# Patient Record
Sex: MALE | Race: ASIAN | URBAN - METROPOLITAN AREA
[De-identification: names, ages, dates, MRNs, and addresses within clinical notes are randomized per-mention and may not be internally consistent; named-entity substitution may affect disease eponyms.]

---

## 2024-01-12 ENCOUNTER — HOSPITAL ENCOUNTER (OUTPATIENT)
Age: 64
Setting detail: SPECIMEN
Discharge: HOME | End: 2024-01-12

## 2024-01-12 ENCOUNTER — OFFICE VISIT (OUTPATIENT)
Dept: PRIMARY CARE CLINIC | Age: 64
End: 2024-01-12
Payer: COMMERCIAL

## 2024-01-12 VITALS
DIASTOLIC BLOOD PRESSURE: 68 MMHG | WEIGHT: 150 LBS | HEART RATE: 77 BPM | SYSTOLIC BLOOD PRESSURE: 94 MMHG | HEIGHT: 63 IN | BODY MASS INDEX: 26.58 KG/M2 | OXYGEN SATURATION: 97 %

## 2024-01-12 DIAGNOSIS — K76.0 NONALCOHOLIC FATTY LIVER: ICD-10-CM

## 2024-01-12 DIAGNOSIS — R73.01 IMPAIRED FASTING GLUCOSE: Primary | ICD-10-CM

## 2024-01-12 DIAGNOSIS — R31.9 HEMATURIA, UNSPECIFIED TYPE: ICD-10-CM

## 2024-01-12 DIAGNOSIS — Z13.220 ENCOUNTER FOR SCREENING FOR LIPID DISORDER: ICD-10-CM

## 2024-01-12 DIAGNOSIS — E04.1 THYROID NODULE: ICD-10-CM

## 2024-01-12 DIAGNOSIS — Z11.59 ENCOUNTER FOR HEPATITIS C SCREENING TEST FOR LOW RISK PATIENT: ICD-10-CM

## 2024-01-12 DIAGNOSIS — N20.0 NEPHROLITHIASIS: ICD-10-CM

## 2024-01-12 DIAGNOSIS — I65.23 BILATERAL CAROTID ARTERY STENOSIS: ICD-10-CM

## 2024-01-12 DIAGNOSIS — Z12.5 ENCOUNTER FOR SCREENING FOR MALIGNANT NEOPLASM OF PROSTATE: ICD-10-CM

## 2024-01-12 DIAGNOSIS — R73.01 IMPAIRED FASTING GLUCOSE: ICD-10-CM

## 2024-01-12 LAB
ALBUMIN SERPL-MCNC: 4.4 G/DL (ref 3.5–5.2)
ALBUMIN/GLOB SERPL: 1.4 {RATIO} (ref 1–2.5)
ALP SERPL-CCNC: 73 U/L (ref 40–129)
ALT SERPL-CCNC: 28 U/L (ref 5–41)
ANION GAP SERPL CALCULATED.3IONS-SCNC: 10 MMOL/L (ref 9–17)
AST SERPL-CCNC: 23 U/L
BACTERIA URNS QL MICRO: NORMAL
BASOPHILS # BLD: 0.07 K/UL (ref 0–0.2)
BASOPHILS NFR BLD: 1 % (ref 0–2)
BILIRUB SERPL-MCNC: 0.6 MG/DL (ref 0.3–1.2)
BILIRUB UR QL STRIP: NEGATIVE
BUN SERPL-MCNC: 13 MG/DL (ref 8–23)
CALCIUM SERPL-MCNC: 9.4 MG/DL (ref 8.6–10.4)
CASTS #/AREA URNS LPF: NORMAL /LPF (ref 0–8)
CHLORIDE SERPL-SCNC: 102 MMOL/L (ref 98–107)
CHOLEST SERPL-MCNC: 196 MG/DL
CHOLESTEROL/HDL RATIO: 4.4
CLARITY UR: CLEAR
CO2 SERPL-SCNC: 27 MMOL/L (ref 20–31)
COLOR UR: YELLOW
CREAT SERPL-MCNC: 0.8 MG/DL (ref 0.7–1.2)
EOSINOPHIL # BLD: 0.18 K/UL (ref 0–0.44)
EOSINOPHILS RELATIVE PERCENT: 3 % (ref 1–4)
EPI CELLS #/AREA URNS HPF: NORMAL /HPF (ref 0–5)
ERYTHROCYTE [DISTWIDTH] IN BLOOD BY AUTOMATED COUNT: 12.9 % (ref 11.8–14.4)
GFR SERPL CREATININE-BSD FRML MDRD: >60 ML/MIN/1.73M2
GLUCOSE SERPL-MCNC: 107 MG/DL (ref 70–99)
GLUCOSE UR STRIP-MCNC: NEGATIVE MG/DL
HCT VFR BLD AUTO: 45.5 % (ref 40.7–50.3)
HCV AB SERPL QL IA: NONREACTIVE
HDLC SERPL-MCNC: 45 MG/DL
HGB BLD-MCNC: 15.1 G/DL (ref 13–17)
HGB UR QL STRIP.AUTO: NEGATIVE
IMM GRANULOCYTES # BLD AUTO: <0.03 K/UL (ref 0–0.3)
IMM GRANULOCYTES NFR BLD: 0 %
KETONES UR STRIP-MCNC: NEGATIVE MG/DL
LDLC SERPL CALC-MCNC: 133 MG/DL (ref 0–130)
LEUKOCYTE ESTERASE UR QL STRIP: NEGATIVE
LYMPHOCYTES NFR BLD: 1.63 K/UL (ref 1.1–3.7)
LYMPHOCYTES RELATIVE PERCENT: 23 % (ref 24–43)
MCH RBC QN AUTO: 31.4 PG (ref 25.2–33.5)
MCHC RBC AUTO-ENTMCNC: 33.2 G/DL (ref 28.4–34.8)
MCV RBC AUTO: 94.6 FL (ref 82.6–102.9)
MONOCYTES NFR BLD: 0.43 K/UL (ref 0.1–1.2)
MONOCYTES NFR BLD: 6 % (ref 3–12)
NEUTROPHILS NFR BLD: 67 % (ref 36–65)
NEUTS SEG NFR BLD: 4.66 K/UL (ref 1.5–8.1)
NITRITE UR QL STRIP: NEGATIVE
NRBC BLD-RTO: 0 PER 100 WBC
PH UR STRIP: 5.5 [PH] (ref 5–8)
PLATELET # BLD AUTO: 277 K/UL (ref 138–453)
PMV BLD AUTO: 9.3 FL (ref 8.1–13.5)
POTASSIUM SERPL-SCNC: 5.2 MMOL/L (ref 3.7–5.3)
PROT SERPL-MCNC: 7.6 G/DL (ref 6.4–8.3)
PROT UR STRIP-MCNC: NEGATIVE MG/DL
PSA SERPL-MCNC: 1.7 NG/ML
RBC # BLD AUTO: 4.81 M/UL (ref 4.21–5.77)
RBC #/AREA URNS HPF: NORMAL /HPF (ref 0–4)
SODIUM SERPL-SCNC: 139 MMOL/L (ref 135–144)
SP GR UR STRIP: 1.01 (ref 1–1.03)
TRIGL SERPL-MCNC: 92 MG/DL
TSH SERPL DL<=0.05 MIU/L-ACNC: 1.23 UIU/ML (ref 0.3–5)
UROBILINOGEN UR STRIP-ACNC: NORMAL EU/DL (ref 0–1)
WBC #/AREA URNS HPF: NORMAL /HPF (ref 0–5)
WBC OTHER # BLD: 7 K/UL (ref 3.5–11.3)

## 2024-01-12 PROCEDURE — 99204 OFFICE O/P NEW MOD 45 MIN: CPT | Performed by: STUDENT IN AN ORGANIZED HEALTH CARE EDUCATION/TRAINING PROGRAM

## 2024-01-12 SDOH — ECONOMIC STABILITY: INCOME INSECURITY: HOW HARD IS IT FOR YOU TO PAY FOR THE VERY BASICS LIKE FOOD, HOUSING, MEDICAL CARE, AND HEATING?: NOT HARD AT ALL

## 2024-01-12 SDOH — ECONOMIC STABILITY: HOUSING INSECURITY
IN THE LAST 12 MONTHS, WAS THERE A TIME WHEN YOU DID NOT HAVE A STEADY PLACE TO SLEEP OR SLEPT IN A SHELTER (INCLUDING NOW)?: NO

## 2024-01-12 SDOH — ECONOMIC STABILITY: FOOD INSECURITY: WITHIN THE PAST 12 MONTHS, YOU WORRIED THAT YOUR FOOD WOULD RUN OUT BEFORE YOU GOT MONEY TO BUY MORE.: NEVER TRUE

## 2024-01-12 SDOH — ECONOMIC STABILITY: FOOD INSECURITY: WITHIN THE PAST 12 MONTHS, THE FOOD YOU BOUGHT JUST DIDN'T LAST AND YOU DIDN'T HAVE MONEY TO GET MORE.: NEVER TRUE

## 2024-01-12 ASSESSMENT — PATIENT HEALTH QUESTIONNAIRE - PHQ9
1. LITTLE INTEREST OR PLEASURE IN DOING THINGS: 0
SUM OF ALL RESPONSES TO PHQ QUESTIONS 1-9: 0
SUM OF ALL RESPONSES TO PHQ9 QUESTIONS 1 & 2: 0
SUM OF ALL RESPONSES TO PHQ QUESTIONS 1-9: 0
2. FEELING DOWN, DEPRESSED OR HOPELESS: 0

## 2024-01-12 NOTE — PROGRESS NOTES
[facility]      Date of Visit:  2024  Patient Name: Jeffery Quan   Patient :  1960     CHIEF COMPLAINT:     Jeffery Quan is a 63 y.o. male who presents today to be evaluated for the following condition(s):  Chief Complaint   Patient presents with    Other     Pt checks his glucose at home due to family hx of diabetes - his numbers are normally around 105-125 in the mornings.     New Patient     Est care with provider - pt will need recent labs, last labs were completed approx 2+ years ago.     Pt had a screening colonoscopy 10+ years ago - removed polyps.          HISTORY OF PRESENT ILLNESS:      HPI   Patient is a new patient to our office who is presenting today to establish care.    Patient is a new patient to our office who is presenting today to establish care.     Patient is accompanied by her  and her son, Sharan, who serves as ; a  was offered but patient preferred to have son translate.       Patient recently relocated to the area from [place]; he has not been following with a primary care physician since [place] to the [place]    Patient has history of prediabetes; monitoring his fasting BG regularly; avg. 110-130.  Also has a h/o NAFLD.  No recent labs.  No N/V, abdominal pain.  Patient has a history of bilateral carotid artery plaque; no recent imaging.  He denies any lightheadedness, dizziness, presyncope or syncope.    Patient has a h/o multiple thyroid nodules; Right thyroid nodule; TI-RADS 2, Left thyroid nodule; TI RADS 3 but unclear of exact sizes.  No issues with anterior neck pain, swelling, dysphagia, odynophagia.  No change in voice or voice hoarseness.    Patient has a h/o nephrolithiasis; no recent imaging.  He has experience gross hematuria in past but not recently.  No dysuria, urgency, frequency.  He has had a pyelogram but no cystoscopy.  Evidently, there was discussion of ESWL but local hospital lacked the capability.    Patient with screening

## 2024-01-13 LAB
EST. AVERAGE GLUCOSE BLD GHB EST-MCNC: 134 MG/DL
HBA1C MFR BLD: 6.3 % (ref 4–6)

## 2024-01-24 ENCOUNTER — TELEPHONE (OUTPATIENT)
Dept: PRIMARY CARE CLINIC | Age: 64
End: 2024-01-24

## 2024-01-24 NOTE — TELEPHONE ENCOUNTER
Pt's son (), called stating has still not heard back from office on results.    He would like a call back please.    Thank you.

## 2024-01-25 DIAGNOSIS — R73.03 PREDIABETES: Primary | ICD-10-CM

## 2024-01-25 RX ORDER — LANCETS 30 GAUGE
1 EACH MISCELLANEOUS DAILY
Qty: 100 EACH | Refills: 4 | Status: SHIPPED | OUTPATIENT
Start: 2024-01-25

## 2024-01-25 RX ORDER — BLOOD-GLUCOSE METER
1 KIT MISCELLANEOUS DAILY
Qty: 1 KIT | Refills: 0 | Status: SHIPPED | OUTPATIENT
Start: 2024-01-25

## 2024-01-25 RX ORDER — PEN NEEDLE, DIABETIC 31 GX5/16"
1 NEEDLE, DISPOSABLE MISCELLANEOUS DAILY
Qty: 100 EACH | Refills: 4 | Status: SHIPPED | OUTPATIENT
Start: 2024-01-25

## 2024-01-25 RX ORDER — GLUCOSAMINE HCL/CHONDROITIN SU 500-400 MG
CAPSULE ORAL
Qty: 100 STRIP | Refills: 4 | Status: SHIPPED | OUTPATIENT
Start: 2024-01-25

## 2024-01-27 PROBLEM — I65.23 BILATERAL CAROTID ARTERY STENOSIS: Status: ACTIVE | Noted: 2024-01-27

## 2024-01-27 PROBLEM — R31.9 HEMATURIA: Status: ACTIVE | Noted: 2024-01-27

## 2024-01-27 PROBLEM — N20.0 NEPHROLITHIASIS: Status: ACTIVE | Noted: 2024-01-27

## 2024-01-27 PROBLEM — R73.01 IMPAIRED FASTING GLUCOSE: Status: ACTIVE | Noted: 2024-01-27

## 2024-01-27 ASSESSMENT — ENCOUNTER SYMPTOMS
DIARRHEA: 0
CONSTIPATION: 0
WHEEZING: 0
SHORTNESS OF BREATH: 0
ABDOMINAL PAIN: 0
VOMITING: 0
COUGH: 0
BLOOD IN STOOL: 0

## 2024-02-05 ENCOUNTER — PATIENT MESSAGE (OUTPATIENT)
Dept: PRIMARY CARE CLINIC | Age: 64
End: 2024-02-05

## 2024-02-05 ENCOUNTER — HOSPITAL ENCOUNTER (OUTPATIENT)
Dept: VASCULAR LAB | Age: 64
Discharge: HOME | End: 2024-02-07
Attending: STUDENT IN AN ORGANIZED HEALTH CARE EDUCATION/TRAINING PROGRAM
Payer: COMMERCIAL

## 2024-02-05 DIAGNOSIS — E78.00 PURE HYPERCHOLESTEROLEMIA: Primary | ICD-10-CM

## 2024-02-05 DIAGNOSIS — I65.23 BILATERAL CAROTID ARTERY STENOSIS: ICD-10-CM

## 2024-02-05 LAB
VAS LEFT BULB EDV: 28.3 CM/S
VAS LEFT BULB PSV: 87.6 CM/S
VAS LEFT CCA DIST EDV: 28.3 CM/S
VAS LEFT CCA DIST PSV: 98.6 CM/S
VAS LEFT CCA PROX EDV: 34.9 CM/S
VAS LEFT CCA PROX PSV: 116.1 CM/S
VAS LEFT ECA EDV: 23.9 CM/S
VAS LEFT ECA PSV: 91.9 CM/S
VAS LEFT ICA DIST EDV: 41.4 CM/S
VAS LEFT ICA DIST PSV: 80.9 CM/S
VAS LEFT ICA PROX EDV: 32.7 CM/S
VAS LEFT ICA PROX PSV: 74.3 CM/S
VAS LEFT ICA/CCA PSV: 0.9 NO UNITS
VAS LEFT VERTEBRAL EDV: 23.6 CM/S
VAS LEFT VERTEBRAL PSV: 65.6 CM/S
VAS RIGHT BULB EDV: 29 CM/S
VAS RIGHT BULB PSV: 94.7 CM/S
VAS RIGHT CCA DIST EDV: 34.5 CM/S
VAS RIGHT CCA DIST PSV: 105.7 CM/S
VAS RIGHT CCA PROX EDV: 30.9 CM/S
VAS RIGHT CCA PROX PSV: 102.1 CM/S
VAS RIGHT ECA EDV: 16.2 CM/S
VAS RIGHT ECA PSV: 89.2 CM/S
VAS RIGHT ICA DIST EDV: 39.9 CM/S
VAS RIGHT ICA DIST PSV: 83.7 CM/S
VAS RIGHT ICA PROX EDV: 29 CM/S
VAS RIGHT ICA PROX PSV: 72.8 CM/S
VAS RIGHT ICA/CCA PSV: 0.9 NO UNITS
VAS RIGHT VERTEBRAL EDV: 18.6 CM/S
VAS RIGHT VERTEBRAL PSV: 53.7 CM/S

## 2024-02-05 PROCEDURE — 93880 EXTRACRANIAL BILAT STUDY: CPT | Performed by: SURGERY

## 2024-02-05 PROCEDURE — 93880 EXTRACRANIAL BILAT STUDY: CPT

## 2024-02-06 NOTE — TELEPHONE ENCOUNTER
From: Jeffery Quan  To: Dr. Rosendo Bobby  Sent: 2/5/2024 11:46 PM EST  Subject: atorvastatin 40 mg    Dr. Bobby, saw your comment on the Carotid test, talked to my father about the statin, he is ready to start it if you are able to send in the Rx. Also would you consider write him some baseline lab orders for LFTS, CK, and maybe a CRP? Thank you for your time!

## 2024-02-07 RX ORDER — ATORVASTATIN CALCIUM 40 MG/1
40 TABLET, FILM COATED ORAL DAILY
Qty: 90 TABLET | Refills: 1 | Status: SHIPPED | OUTPATIENT
Start: 2024-02-07

## 2024-02-12 ENCOUNTER — OFFICE VISIT (OUTPATIENT)
Dept: PRIMARY CARE CLINIC | Age: 64
End: 2024-02-12
Payer: COMMERCIAL

## 2024-02-12 VITALS
BODY MASS INDEX: 27.11 KG/M2 | SYSTOLIC BLOOD PRESSURE: 102 MMHG | WEIGHT: 153 LBS | DIASTOLIC BLOOD PRESSURE: 60 MMHG | OXYGEN SATURATION: 95 % | HEART RATE: 79 BPM | HEIGHT: 63 IN

## 2024-02-12 DIAGNOSIS — E78.00 PURE HYPERCHOLESTEROLEMIA: Primary | ICD-10-CM

## 2024-02-12 DIAGNOSIS — K21.9 GASTROESOPHAGEAL REFLUX DISEASE WITHOUT ESOPHAGITIS: ICD-10-CM

## 2024-02-12 DIAGNOSIS — G47.33 OBSTRUCTIVE SLEEP APNEA: ICD-10-CM

## 2024-02-12 DIAGNOSIS — J30.9 ALLERGIC RHINITIS, UNSPECIFIED SEASONALITY, UNSPECIFIED TRIGGER: ICD-10-CM

## 2024-02-12 DIAGNOSIS — H04.129 DRY EYE: ICD-10-CM

## 2024-02-12 DIAGNOSIS — R73.03 PREDIABETES: ICD-10-CM

## 2024-02-12 PROCEDURE — 99417 PROLNG OP E/M EACH 15 MIN: CPT | Performed by: STUDENT IN AN ORGANIZED HEALTH CARE EDUCATION/TRAINING PROGRAM

## 2024-02-12 PROCEDURE — 99215 OFFICE O/P EST HI 40 MIN: CPT | Performed by: STUDENT IN AN ORGANIZED HEALTH CARE EDUCATION/TRAINING PROGRAM

## 2024-02-12 RX ORDER — OMEPRAZOLE 20 MG/1
20 CAPSULE, DELAYED RELEASE ORAL
Qty: 30 CAPSULE | Refills: 2 | Status: SHIPPED | OUTPATIENT
Start: 2024-02-12

## 2024-02-12 RX ORDER — FLUTICASONE PROPIONATE 50 MCG
1 SPRAY, SUSPENSION (ML) NASAL DAILY
Qty: 32 G | Refills: 1 | Status: SHIPPED | OUTPATIENT
Start: 2024-02-12

## 2024-02-12 NOTE — PROGRESS NOTES
[facility]      Date of Visit:  2024  Patient Name: Jeffery Quan   Patient :  1960     CHIEF COMPLAINT:     Jeffery Quan is a 63 y.o. male who presents today to be evaluated for the following condition(s):  Chief Complaint   Patient presents with    Follow-up     Pt would like to review labs from [facility]. There was elevated glucose that pt's son has questions about. Pt would like to know if he needs medications at this time.     Sleep Apnea     Pt is having trouble sleeping even while using his CPAP. Pt states that he feels like there is inflammation in his nostrils after using the machine.        HISTORY OF PRESENT ILLNESS:      HPI     REVIEW OF SYSTEMS:     Review of Systems     REVIEWED INFORMATION:      Current Outpatient Medications on File Prior to Visit   Medication Sig Dispense Refill    atorvastatin (LIPITOR) 40 MG tablet Take 1 tablet by mouth daily 90 tablet 1    glucose monitoring kit 1 kit by Does not apply route daily 1 kit 0    blood glucose monitor strips Test 1 time daily & as needed for symptoms of irregular blood glucose. Dispense sufficient amount for indicated testing frequency plus additional to accommodate PRN testing needs. 100 strip 4    Lancets MISC 1 each by Does not apply route daily 100 each 4    Alcohol Swabs (ALCOHOL PREP) PADS 1 each by Does not apply route daily 100 each 4     No current facility-administered medications on file prior to visit.       No Known Allergies    Patient Active Problem List   Diagnosis    Nonalcoholic fatty liver    Thyroid nodule    Impaired fasting glucose    Nephrolithiasis    Hematuria    Bilateral carotid artery stenosis    Pure hypercholesterolemia    Obstructive sleep apnea    Dry eye    Allergic rhinitis    Gastroesophageal reflux disease without esophagitis       Past Medical History:   Diagnosis Date    Carotid atherosclerosis     Cataracts, bilateral     blurry vision    Hydronephrosis     R side    Kidney stone     R side

## 2024-02-26 ENCOUNTER — HOSPITAL ENCOUNTER (OUTPATIENT)
Dept: ULTRASOUND IMAGING | Age: 64
Discharge: HOME | End: 2024-02-28
Attending: STUDENT IN AN ORGANIZED HEALTH CARE EDUCATION/TRAINING PROGRAM
Payer: COMMERCIAL

## 2024-02-26 DIAGNOSIS — E04.1 THYROID NODULE: ICD-10-CM

## 2024-02-26 PROCEDURE — 76536 US EXAM OF HEAD AND NECK: CPT

## 2024-03-05 ENCOUNTER — HOSPITAL ENCOUNTER (OUTPATIENT)
Age: 64
Discharge: HOME | End: 2024-03-05
Payer: COMMERCIAL

## 2024-03-05 ENCOUNTER — HOSPITAL ENCOUNTER (OUTPATIENT)
Dept: CT IMAGING | Age: 64
Discharge: HOME | End: 2024-03-07
Attending: STUDENT IN AN ORGANIZED HEALTH CARE EDUCATION/TRAINING PROGRAM
Payer: COMMERCIAL

## 2024-03-05 DIAGNOSIS — R31.9 HEMATURIA, UNSPECIFIED TYPE: ICD-10-CM

## 2024-03-05 DIAGNOSIS — N20.0 NEPHROLITHIASIS: ICD-10-CM

## 2024-03-05 DIAGNOSIS — E78.00 PURE HYPERCHOLESTEROLEMIA: ICD-10-CM

## 2024-03-05 LAB
CREAT SERPL-MCNC: 0.9 MG/DL (ref 0.7–1.2)
GFR SERPL CREATININE-BSD FRML MDRD: >60 ML/MIN/1.73M2

## 2024-03-05 PROCEDURE — 2580000003 HC RX 258: Performed by: STUDENT IN AN ORGANIZED HEALTH CARE EDUCATION/TRAINING PROGRAM

## 2024-03-05 PROCEDURE — 74178 CT ABD&PLV WO CNTR FLWD CNTR: CPT | Performed by: STUDENT IN AN ORGANIZED HEALTH CARE EDUCATION/TRAINING PROGRAM

## 2024-03-05 PROCEDURE — 82565 ASSAY OF CREATININE: CPT

## 2024-03-05 PROCEDURE — 6360000004 HC RX CONTRAST MEDICATION: Performed by: STUDENT IN AN ORGANIZED HEALTH CARE EDUCATION/TRAINING PROGRAM

## 2024-03-05 PROCEDURE — 80053 COMPREHEN METABOLIC PANEL: CPT

## 2024-03-05 RX ORDER — SODIUM CHLORIDE 0.9 % (FLUSH) 0.9 %
10 SYRINGE (ML) INJECTION PRN
Status: DISCONTINUED | OUTPATIENT
Start: 2024-03-05 | End: 2024-03-08 | Stop reason: HOSPADM

## 2024-03-05 RX ORDER — 0.9 % SODIUM CHLORIDE 0.9 %
80 INTRAVENOUS SOLUTION INTRAVENOUS ONCE
Status: COMPLETED | OUTPATIENT
Start: 2024-03-05 | End: 2024-03-05

## 2024-03-05 RX ADMIN — IOPAMIDOL 120 ML: 755 INJECTION, SOLUTION INTRAVENOUS at 14:43

## 2024-03-05 RX ADMIN — SODIUM CHLORIDE 80 ML: 9 INJECTION, SOLUTION INTRAVENOUS at 14:44

## 2024-03-05 RX ADMIN — SODIUM CHLORIDE, PRESERVATIVE FREE 10 ML: 5 INJECTION INTRAVENOUS at 14:43

## 2024-03-06 LAB
ALBUMIN SERPL-MCNC: 4.4 G/DL (ref 3.5–5.2)
ALBUMIN/GLOB SERPL: 1 {RATIO} (ref 1–2.5)
ALP SERPL-CCNC: 74 U/L (ref 40–129)
ALT SERPL-CCNC: 26 U/L (ref 10–50)
ANION GAP SERPL CALCULATED.3IONS-SCNC: 14 MMOL/L (ref 9–16)
AST SERPL-CCNC: 32 U/L (ref 10–50)
BILIRUB SERPL-MCNC: 0.4 MG/DL (ref 0–1.2)
BUN SERPL-MCNC: 14 MG/DL (ref 8–23)
CALCIUM SERPL-MCNC: 9.4 MG/DL (ref 8.6–10.4)
CHLORIDE SERPL-SCNC: 106 MMOL/L (ref 98–107)
CO2 SERPL-SCNC: 24 MMOL/L (ref 20–31)
CREAT SERPL-MCNC: 0.9 MG/DL (ref 0.7–1.2)
GFR SERPL CREATININE-BSD FRML MDRD: >60 ML/MIN/1.73M2
GLUCOSE SERPL-MCNC: 86 MG/DL (ref 74–99)
POTASSIUM SERPL-SCNC: 4.9 MMOL/L (ref 3.7–5.3)
PROT SERPL-MCNC: 7.6 G/DL (ref 6.6–8.7)
SODIUM SERPL-SCNC: 144 MMOL/L (ref 136–145)

## 2024-03-11 DIAGNOSIS — E78.00 PURE HYPERCHOLESTEROLEMIA: ICD-10-CM

## 2024-03-11 DIAGNOSIS — I65.23 BILATERAL CAROTID ARTERY STENOSIS: ICD-10-CM

## 2024-03-11 DIAGNOSIS — R73.03 PREDIABETES: ICD-10-CM

## 2024-03-11 RX ORDER — ATORVASTATIN CALCIUM 40 MG/1
40 TABLET, FILM COATED ORAL DAILY
Qty: 90 TABLET | Refills: 1 | Status: SHIPPED | OUTPATIENT
Start: 2024-03-11

## 2024-03-11 RX ORDER — GLUCOSAMINE HCL/CHONDROITIN SU 500-400 MG
CAPSULE ORAL
Qty: 100 STRIP | Refills: 4 | Status: CANCELLED | OUTPATIENT
Start: 2024-03-11

## 2024-03-11 RX ORDER — ATORVASTATIN CALCIUM 40 MG/1
40 TABLET, FILM COATED ORAL DAILY
Qty: 90 TABLET | Refills: 1 | OUTPATIENT
Start: 2024-03-11

## 2024-03-11 NOTE — TELEPHONE ENCOUNTER
Patient Comment: Can you send this to [facility] mail order pharmacy instead of please? Thank you       Jeffery Quan is requesting a refill on the following medication(s):  Requested Prescriptions     Pending Prescriptions Disp Refills    atorvastatin (LIPITOR) 40 MG tablet 90 tablet 1     Sig: Take 1 tablet by mouth daily     Refused Prescriptions Disp Refills    atorvastatin (LIPITOR) 40 MG tablet 90 tablet 1     Sig: Take 1 tablet by mouth daily     Refused By: KONG DOUGLASS     Reason for Refusal: Request already responded to by other means (e.g. phone or fax)       Last Visit Date (If Applicable):  2/12/2024    Next Visit Date:    3/11/2024

## 2024-03-11 NOTE — TELEPHONE ENCOUNTER
Patient Comment: Can you send the test strip brand “one touch ultra test strip” send to [facility] mail pharmacy instead of [facility] please? Thank you       Caryjavier Quan is requesting a refill on the following medication(s):  Requested Prescriptions     Pending Prescriptions Disp Refills    blood glucose monitor strips 100 strip 4     Sig: Test 1 time daily & as needed for symptoms of irregular blood glucose. Dispense sufficient amount for indicated testing frequency plus additional to accommodate PRN testing needs.       Last Visit Date (If Applicable):  2/12/2024    Next Visit Date:    3/25/2024

## 2024-03-25 ENCOUNTER — HOSPITAL ENCOUNTER (OUTPATIENT)
Age: 64
Setting detail: SPECIMEN
Discharge: HOME | End: 2024-03-25

## 2024-03-25 DIAGNOSIS — E78.00 PURE HYPERCHOLESTEROLEMIA: ICD-10-CM

## 2024-03-25 LAB
CHOLEST SERPL-MCNC: 104 MG/DL (ref 0–199)
CHOLESTEROL/HDL RATIO: 2
HDLC SERPL-MCNC: 47 MG/DL
LDLC SERPL CALC-MCNC: 45 MG/DL (ref 0–100)
TRIGL SERPL-MCNC: 63 MG/DL
VLDLC SERPL CALC-MCNC: 13 MG/DL

## 2024-04-03 DIAGNOSIS — R73.01 IMPAIRED FASTING GLUCOSE: Primary | ICD-10-CM

## 2024-04-03 DIAGNOSIS — E78.00 PURE HYPERCHOLESTEROLEMIA: ICD-10-CM

## 2024-04-03 RX ORDER — ATORVASTATIN CALCIUM 20 MG/1
20 TABLET, FILM COATED ORAL DAILY
Qty: 90 TABLET | Refills: 3 | Status: SHIPPED | OUTPATIENT
Start: 2024-04-03 | End: 2024-04-03

## 2024-04-03 RX ORDER — GLUCOSAMINE HCL/CHONDROITIN SU 500-400 MG
CAPSULE ORAL
Qty: 90 STRIP | Refills: 3 | Status: SHIPPED | OUTPATIENT
Start: 2024-04-03

## 2024-04-03 RX ORDER — GLUCOSAMINE HCL/CHONDROITIN SU 500-400 MG
CAPSULE ORAL
Qty: 90 STRIP | Refills: 3 | Status: SHIPPED | OUTPATIENT
Start: 2024-04-03 | End: 2024-04-03 | Stop reason: CLARIF

## 2024-04-03 RX ORDER — BLOOD-GLUCOSE METER
1 KIT MISCELLANEOUS DAILY
Qty: 1 KIT | Refills: 0 | Status: SHIPPED | OUTPATIENT
Start: 2024-04-03

## 2024-04-03 RX ORDER — ATORVASTATIN CALCIUM 20 MG/1
20 TABLET, FILM COATED ORAL DAILY
Qty: 90 TABLET | Refills: 3 | Status: SHIPPED | OUTPATIENT
Start: 2024-04-03

## 2024-04-16 ENCOUNTER — OFFICE VISIT (OUTPATIENT)
Dept: PRIMARY CARE CLINIC | Age: 64
End: 2024-04-16
Payer: COMMERCIAL

## 2024-04-16 VITALS
OXYGEN SATURATION: 95 % | HEIGHT: 63 IN | SYSTOLIC BLOOD PRESSURE: 98 MMHG | HEART RATE: 75 BPM | BODY MASS INDEX: 27.29 KG/M2 | DIASTOLIC BLOOD PRESSURE: 56 MMHG | WEIGHT: 154 LBS

## 2024-04-16 DIAGNOSIS — N20.0 NEPHROLITHIASIS: ICD-10-CM

## 2024-04-16 DIAGNOSIS — R73.03 PREDIABETES: Primary | ICD-10-CM

## 2024-04-16 DIAGNOSIS — E78.00 PURE HYPERCHOLESTEROLEMIA: ICD-10-CM

## 2024-04-16 PROCEDURE — 99214 OFFICE O/P EST MOD 30 MIN: CPT | Performed by: STUDENT IN AN ORGANIZED HEALTH CARE EDUCATION/TRAINING PROGRAM

## 2024-04-16 PROCEDURE — 83036 HEMOGLOBIN GLYCOSYLATED A1C: CPT | Performed by: STUDENT IN AN ORGANIZED HEALTH CARE EDUCATION/TRAINING PROGRAM

## 2024-04-16 RX ORDER — IBUPROFEN 800 MG/1
800 TABLET ORAL 3 TIMES DAILY PRN
Qty: 90 TABLET | Refills: 0 | Status: SHIPPED | OUTPATIENT
Start: 2024-04-16 | End: 2024-05-06 | Stop reason: SDUPTHER

## 2024-04-16 NOTE — PROGRESS NOTES
[facility]      Date of Visit:  2024  Patient Name: Jeffery Quan   Patient :  1960     CHIEF COMPLAINT:     Jeffery Quan is a 63 y.o. male who presents today to be evaluated for the following condition(s):  Chief Complaint   Patient presents with    Follow-up     Pt was unable to get the test strips from the pharmacy to check his BG. Pt denies fatigue, shaking, clamminess, floaters in his vision.        HISTORY OF PRESENT ILLNESS:      HPI     REVIEW OF SYSTEMS:     Review of Systems   Constitutional:  Negative for chills, fatigue and fever.   Respiratory:  Negative for cough, shortness of breath and wheezing.    Cardiovascular:  Negative for chest pain, palpitations and leg swelling.   Gastrointestinal:  Negative for abdominal pain, blood in stool, constipation, diarrhea, nausea and vomiting.   Neurological:  Negative for dizziness, weakness, light-headedness, numbness and headaches.        REVIEWED INFORMATION:      Current Outpatient Medications on File Prior to Visit   Medication Sig Dispense Refill    glucose monitoring kit 1 kit by Does not apply route daily 1 kit 0    atorvastatin (LIPITOR) 20 MG tablet Take 1 tablet by mouth daily 90 tablet 3    blood glucose monitor strips Test once a day & as needed for symptoms of irregular blood glucose. Dispense sufficient amount for indicated testing frequency plus additional to accommodate PRN testing needs. 90 strip 3    fluticasone (FLONASE) 50 MCG/ACT nasal spray 1 spray by Each Nostril route daily 32 g 1    omeprazole (PRILOSEC) 20 MG delayed release capsule Take 1 capsule by mouth every morning (before breakfast) 30 capsule 2    Lancets MISC 1 each by Does not apply route daily 100 each 4    Alcohol Swabs (ALCOHOL PREP) PADS 1 each by Does not apply route daily 100 each 4     No current facility-administered medications on file prior to visit.       No Known Allergies    Patient Active Problem List   Diagnosis    Nonalcoholic fatty liver

## 2024-05-07 RX ORDER — IBUPROFEN 800 MG/1
800 TABLET ORAL 3 TIMES DAILY PRN
Qty: 90 TABLET | Refills: 0 | Status: SHIPPED | OUTPATIENT
Start: 2024-05-07

## 2024-05-15 ENCOUNTER — TELEPHONE (OUTPATIENT)
Dept: PRIMARY CARE CLINIC | Age: 64
End: 2024-05-15

## 2024-05-15 NOTE — TELEPHONE ENCOUNTER
Madiha from [facility] pharmacy benefits calling to inform that the script you wrote for test strips need to be changed, need to be changed to 300 strips for 90 day supply, stated the patient check his sugars after every meal, stated this will be covered with the insurance, this can be sent to the same [facility] pharmacy and the patient would like a call back once this is complete

## 2024-05-16 DIAGNOSIS — J30.9 ALLERGIC RHINITIS, UNSPECIFIED SEASONALITY, UNSPECIFIED TRIGGER: ICD-10-CM

## 2024-05-16 DIAGNOSIS — R73.01 IMPAIRED FASTING GLUCOSE: ICD-10-CM

## 2024-05-16 RX ORDER — GLUCOSAMINE HCL/CHONDROITIN SU 500-400 MG
CAPSULE ORAL
Qty: 90 STRIP | Refills: 3 | Status: CANCELLED | OUTPATIENT
Start: 2024-05-16

## 2024-05-17 DIAGNOSIS — R73.01 IMPAIRED FASTING GLUCOSE: Primary | ICD-10-CM

## 2024-05-17 RX ORDER — FLUTICASONE PROPIONATE 50 MCG
1 SPRAY, SUSPENSION (ML) NASAL DAILY
Qty: 32 G | Refills: 1 | Status: SHIPPED | OUTPATIENT
Start: 2024-05-17

## 2024-05-17 RX ORDER — GLUCOSAMINE HCL/CHONDROITIN SU 500-400 MG
CAPSULE ORAL
Qty: 100 STRIP | Refills: 3 | Status: SHIPPED | OUTPATIENT
Start: 2024-05-17

## 2024-05-17 NOTE — TELEPHONE ENCOUNTER
Last OV:  4/16/2024    Next OV: 5/16/2024    Pharmacy:     [facility] - [place] [address] - P [phone] - F [phone]  [address] [place] [address]  Phone: [phone] Fax: [phone]    Confirmed? Yes

## 2024-05-17 NOTE — TELEPHONE ENCOUNTER
Last OV:  4/16/2024    Next OV: Visit date not found    Pharmacy:   [id number] - [place], [place] - [address] - P [phone] - F [phone]  [id number] [id number]  Phone: [phone] Fax: [phone]        Confirmed? Yes

## 2024-05-17 NOTE — TELEPHONE ENCOUNTER
Last Visit Date: 4/16/2024   Next Visit Date: Visit date not found     Pharmacy is requesting a new Rx for pt's BG testing strips.

## 2024-06-24 ASSESSMENT — ENCOUNTER SYMPTOMS
COUGH: 0
WHEEZING: 0
NAUSEA: 0
SHORTNESS OF BREATH: 0
ABDOMINAL PAIN: 0
DIARRHEA: 0
VOMITING: 0
CONSTIPATION: 0
BLOOD IN STOOL: 0

## 2024-07-31 ENCOUNTER — HOSPITAL ENCOUNTER (OUTPATIENT)
Age: 64
Setting detail: SPECIMEN
Discharge: HOME | End: 2024-07-31

## 2024-07-31 DIAGNOSIS — E78.00 PURE HYPERCHOLESTEROLEMIA: ICD-10-CM

## 2024-07-31 LAB
ALBUMIN SERPL-MCNC: 4.4 G/DL (ref 3.5–5.2)
ALBUMIN/GLOB SERPL: 2 {RATIO} (ref 1–2.5)
ALP SERPL-CCNC: 78 U/L (ref 40–129)
ALT SERPL-CCNC: 31 U/L (ref 10–50)
ANION GAP SERPL CALCULATED.3IONS-SCNC: 10 MMOL/L (ref 9–16)
AST SERPL-CCNC: 26 U/L (ref 10–50)
BILIRUB SERPL-MCNC: 0.4 MG/DL (ref 0–1.2)
BUN SERPL-MCNC: 18 MG/DL (ref 8–23)
CALCIUM SERPL-MCNC: 9 MG/DL (ref 8.6–10.4)
CHLORIDE SERPL-SCNC: 107 MMOL/L (ref 98–107)
CHOLEST SERPL-MCNC: 122 MG/DL (ref 0–199)
CHOLESTEROL/HDL RATIO: 2
CO2 SERPL-SCNC: 25 MMOL/L (ref 20–31)
CREAT SERPL-MCNC: 0.9 MG/DL (ref 0.7–1.2)
GFR, ESTIMATED: >90 ML/MIN/1.73M2
GLUCOSE P FAST SERPL-MCNC: 107 MG/DL (ref 74–99)
HDLC SERPL-MCNC: 51 MG/DL
LDLC SERPL CALC-MCNC: 50 MG/DL (ref 0–100)
POTASSIUM SERPL-SCNC: 4.3 MMOL/L (ref 3.7–5.3)
PROT SERPL-MCNC: 7.3 G/DL (ref 6.6–8.7)
SODIUM SERPL-SCNC: 142 MMOL/L (ref 136–145)
TRIGL SERPL-MCNC: 103 MG/DL (ref 0–149)
VLDLC SERPL CALC-MCNC: 21 MG/DL

## 2024-11-03 DIAGNOSIS — J30.9 ALLERGIC RHINITIS, UNSPECIFIED SEASONALITY, UNSPECIFIED TRIGGER: ICD-10-CM

## 2024-11-05 NOTE — TELEPHONE ENCOUNTER
Jeffery Quan is calling to request a refill on the following medication(s):    Medication Request:  Requested Prescriptions     Pending Prescriptions Disp Refills    fluticasone (FLONASE) 50 MCG/ACT nasal spray [Pharmacy Med Name: FLUTICASONE  SPR 50MCG RX] 32 g 1     Sig: USE 1 SPRAY IN EACH NOSTRILDAILY     MG tablet [Pharmacy Med Name: IBU TAB 800MG] 90 tablet 0     Sig: TAKE 1 TABLET 3 TIMES A DAYAS NEEDED FOR PAIN       Last Visit Date (If Applicable):  4/16/2024    Next Visit Date:    Visit date not found

## 2024-11-06 RX ORDER — IBUPROFEN 800 MG/1
TABLET ORAL
Qty: 90 TABLET | Refills: 0 | Status: SHIPPED | OUTPATIENT
Start: 2024-11-06 | End: 2024-12-19 | Stop reason: SDUPTHER

## 2024-11-06 RX ORDER — FLUTICASONE PROPIONATE 50 MCG
SPRAY, SUSPENSION (ML) NASAL
Qty: 32 G | Refills: 1 | Status: SHIPPED | OUTPATIENT
Start: 2024-11-06 | End: 2024-12-19 | Stop reason: SDUPTHER

## 2024-11-13 DIAGNOSIS — R73.01 IMPAIRED FASTING GLUCOSE: ICD-10-CM

## 2024-11-15 RX ORDER — BLOOD-GLUCOSE METER
EACH MISCELLANEOUS
Qty: 1 KIT | Refills: 0 | Status: SHIPPED | OUTPATIENT
Start: 2024-11-15

## 2024-12-10 ENCOUNTER — PATIENT MESSAGE (OUTPATIENT)
Dept: PRIMARY CARE CLINIC | Age: 64
End: 2024-12-10

## 2024-12-10 DIAGNOSIS — R73.03 PREDIABETES: ICD-10-CM

## 2024-12-10 DIAGNOSIS — E78.00 PURE HYPERCHOLESTEROLEMIA: Primary | ICD-10-CM

## 2024-12-12 ENCOUNTER — HOSPITAL ENCOUNTER (OUTPATIENT)
Age: 64
Setting detail: SPECIMEN
Discharge: HOME | End: 2024-12-12

## 2024-12-12 DIAGNOSIS — E78.00 PURE HYPERCHOLESTEROLEMIA: ICD-10-CM

## 2024-12-12 DIAGNOSIS — R73.03 PREDIABETES: ICD-10-CM

## 2024-12-12 LAB
ALBUMIN SERPL-MCNC: 4.4 G/DL (ref 3.5–5.2)
ALBUMIN/GLOB SERPL: 1.4 {RATIO} (ref 1–2.5)
ALP SERPL-CCNC: 78 U/L (ref 40–129)
ALT SERPL-CCNC: 25 U/L (ref 10–50)
ANION GAP SERPL CALCULATED.3IONS-SCNC: 9 MMOL/L (ref 9–16)
AST SERPL-CCNC: 25 U/L (ref 10–50)
BILIRUB SERPL-MCNC: 0.5 MG/DL (ref 0–1.2)
BUN SERPL-MCNC: 16 MG/DL (ref 8–23)
CALCIUM SERPL-MCNC: 8.9 MG/DL (ref 8.6–10.4)
CHLORIDE SERPL-SCNC: 107 MMOL/L (ref 98–107)
CHOLEST SERPL-MCNC: 123 MG/DL (ref 0–199)
CHOLESTEROL/HDL RATIO: 2.7
CO2 SERPL-SCNC: 28 MMOL/L (ref 20–31)
CREAT SERPL-MCNC: 0.9 MG/DL (ref 0.7–1.2)
EST. AVERAGE GLUCOSE BLD GHB EST-MCNC: 154 MG/DL
GFR, ESTIMATED: >90 ML/MIN/1.73M2
GLUCOSE P FAST SERPL-MCNC: 118 MG/DL (ref 74–99)
HBA1C MFR BLD: 7 % (ref 4–6)
HDLC SERPL-MCNC: 46 MG/DL
LDLC SERPL CALC-MCNC: 65 MG/DL (ref 0–100)
POTASSIUM SERPL-SCNC: 4.3 MMOL/L (ref 3.7–5.3)
PROT SERPL-MCNC: 7.5 G/DL (ref 6.6–8.7)
SODIUM SERPL-SCNC: 144 MMOL/L (ref 136–145)
TRIGL SERPL-MCNC: 59 MG/DL (ref 0–149)
VLDLC SERPL CALC-MCNC: 12 MG/DL (ref 1–30)

## 2024-12-19 DIAGNOSIS — R73.01 IMPAIRED FASTING GLUCOSE: ICD-10-CM

## 2024-12-19 DIAGNOSIS — K21.9 GASTROESOPHAGEAL REFLUX DISEASE WITHOUT ESOPHAGITIS: ICD-10-CM

## 2024-12-19 DIAGNOSIS — J30.9 ALLERGIC RHINITIS, UNSPECIFIED SEASONALITY, UNSPECIFIED TRIGGER: ICD-10-CM

## 2024-12-19 NOTE — TELEPHONE ENCOUNTER
Jeffery Quan is requesting a refill on the following medication(s):  Requested Prescriptions     Pending Prescriptions Disp Refills    Blood Glucose Monitoring Suppl (ACCU-CHEK GUIDE ME) w/Device KIT 1 kit 0       Last Visit Date (If Applicable):  4/16/2024    Next Visit Date:    12/19/2024

## 2024-12-19 NOTE — TELEPHONE ENCOUNTER
Jeffery Quan is requesting a refill on the following medication(s):  Requested Prescriptions     Pending Prescriptions Disp Refills    blood glucose monitor strips 100 strip 3     Sig: Test once a day & as needed for symptoms of irregular blood glucose. Dispense sufficient amount for indicated testing frequency plus additional to accommodate PRN testing needs.Test once a day & as needed for symptoms of irregular blood glucose. Dispense sufficient amount for indicated testing frequency plus additional to accommodate PRN testing needs.       Last Visit Date (If Applicable):  4/16/2024    Next Visit Date:    12/19/2024

## 2024-12-19 NOTE — TELEPHONE ENCOUNTER
Jeffery Quan is requesting a refill on the following medication(s):  Requested Prescriptions     Pending Prescriptions Disp Refills    omeprazole (PRILOSEC) 20 MG delayed release capsule 30 capsule 2     Sig: Take 1 capsule by mouth every morning (before breakfast)       Last Visit Date (If Applicable):  4/16/2024    Next Visit Date:    12/26/2024

## 2024-12-19 NOTE — TELEPHONE ENCOUNTER
Jeffery Quan is requesting a refill on the following medication(s):  Requested Prescriptions     Pending Prescriptions Disp Refills    fluticasone (FLONASE) 50 MCG/ACT nasal spray 32 g 1       Last Visit Date (If Applicable):  4/16/2024    Next Visit Date:    12/19/2024

## 2024-12-19 NOTE — TELEPHONE ENCOUNTER
Jeffery Quan is requesting a refill on the following medication(s):  Requested Prescriptions     Pending Prescriptions Disp Refills    ibuprofen (IBU) 800 MG tablet 90 tablet 0       Last Visit Date (If Applicable):  4/16/2024    Next Visit Date:    12/26/2024

## 2024-12-20 DIAGNOSIS — K21.9 GASTROESOPHAGEAL REFLUX DISEASE WITHOUT ESOPHAGITIS: ICD-10-CM

## 2024-12-20 RX ORDER — BLOOD-GLUCOSE METER
EACH MISCELLANEOUS
Qty: 1 KIT | Refills: 0 | Status: SHIPPED | OUTPATIENT
Start: 2024-12-20

## 2024-12-20 RX ORDER — FLUTICASONE PROPIONATE 50 MCG
1 SPRAY, SUSPENSION (ML) NASAL DAILY
Qty: 32 G | Refills: 3 | Status: SHIPPED | OUTPATIENT
Start: 2024-12-20

## 2024-12-20 RX ORDER — IBUPROFEN 800 MG/1
800 TABLET, FILM COATED ORAL EVERY 8 HOURS PRN
Qty: 90 TABLET | Refills: 0 | Status: SHIPPED | OUTPATIENT
Start: 2024-12-20

## 2024-12-20 RX ORDER — GLUCOSAMINE HCL/CHONDROITIN SU 500-400 MG
CAPSULE ORAL
Qty: 100 STRIP | Refills: 3 | Status: SHIPPED | OUTPATIENT
Start: 2024-12-20

## 2024-12-23 NOTE — TELEPHONE ENCOUNTER
Patient comment: the previous refills were sent to [facility], can you send it to [facility] mail order pharmacy instead please?

## 2024-12-26 DIAGNOSIS — R73.03 PREDIABETES: Primary | ICD-10-CM

## 2024-12-26 RX ORDER — METFORMIN HYDROCHLORIDE 500 MG/1
500 TABLET, EXTENDED RELEASE ORAL
Qty: 90 TABLET | Refills: 1 | Status: SHIPPED | OUTPATIENT
Start: 2024-12-26

## 2025-04-25 DIAGNOSIS — E78.00 PURE HYPERCHOLESTEROLEMIA: ICD-10-CM

## 2025-04-28 RX ORDER — ATORVASTATIN CALCIUM 20 MG/1
20 TABLET, FILM COATED ORAL DAILY
Qty: 90 TABLET | Refills: 3 | OUTPATIENT
Start: 2025-04-28

## 2025-06-27 DIAGNOSIS — E78.00 PURE HYPERCHOLESTEROLEMIA: ICD-10-CM

## 2025-06-30 ENCOUNTER — HOSPITAL ENCOUNTER (EMERGENCY)
Age: 65
Discharge: HOME | End: 2025-06-30
Attending: EMERGENCY MEDICINE

## 2025-06-30 VITALS
RESPIRATION RATE: 14 BRPM | OXYGEN SATURATION: 94 % | SYSTOLIC BLOOD PRESSURE: 121 MMHG | TEMPERATURE: 98 F | HEART RATE: 74 BPM | HEIGHT: 63 IN | DIASTOLIC BLOOD PRESSURE: 72 MMHG | WEIGHT: 140 LBS | BODY MASS INDEX: 24.8 KG/M2

## 2025-06-30 DIAGNOSIS — S61.011A LACERATION OF RIGHT THUMB WITHOUT FOREIGN BODY WITHOUT DAMAGE TO NAIL, INITIAL ENCOUNTER: Primary | ICD-10-CM

## 2025-06-30 PROCEDURE — 12001 RPR S/N/AX/GEN/TRNK 2.5CM/<: CPT

## 2025-06-30 PROCEDURE — 99282 EMERGENCY DEPT VISIT SF MDM: CPT

## 2025-06-30 PROCEDURE — 90471 IMMUNIZATION ADMIN: CPT

## 2025-06-30 PROCEDURE — 6360000002 HC RX W HCPCS

## 2025-06-30 PROCEDURE — 90715 TDAP VACCINE 7 YRS/> IM: CPT

## 2025-06-30 RX ORDER — ATORVASTATIN CALCIUM 20 MG/1
20 TABLET, FILM COATED ORAL DAILY
Qty: 30 TABLET | Refills: 0 | Status: SHIPPED | OUTPATIENT
Start: 2025-06-30

## 2025-06-30 RX ORDER — LIDOCAINE HYDROCHLORIDE 10 MG/ML
INJECTION, SOLUTION EPIDURAL; INFILTRATION; INTRACAUDAL; PERINEURAL
Status: COMPLETED
Start: 2025-06-30 | End: 2025-06-30

## 2025-06-30 RX ORDER — LIDOCAINE HYDROCHLORIDE 10 MG/ML
5 INJECTION, SOLUTION INFILTRATION; PERINEURAL ONCE
Status: COMPLETED | OUTPATIENT
Start: 2025-06-30 | End: 2025-06-30

## 2025-06-30 RX ADMIN — TETANUS TOXOID, REDUCED DIPHTHERIA TOXOID AND ACELLULAR PERTUSSIS VACCINE, ADSORBED 0.5 ML: 5; 2.5; 8; 8; 2.5 SUSPENSION INTRAMUSCULAR at 22:24

## 2025-06-30 RX ADMIN — LIDOCAINE HYDROCHLORIDE 5 ML: 10 INJECTION, SOLUTION EPIDURAL; INFILTRATION; INTRACAUDAL; PERINEURAL at 21:19

## 2025-06-30 RX ADMIN — LIDOCAINE HYDROCHLORIDE 5 ML: 10 INJECTION, SOLUTION INFILTRATION; PERINEURAL at 21:19

## 2025-06-30 ASSESSMENT — PAIN DESCRIPTION - ORIENTATION: ORIENTATION: RIGHT

## 2025-06-30 ASSESSMENT — PAIN DESCRIPTION - LOCATION: LOCATION: FINGER (COMMENT WHICH ONE)

## 2025-06-30 ASSESSMENT — PAIN SCALES - GENERAL: PAINLEVEL_OUTOF10: 3

## 2025-06-30 ASSESSMENT — PAIN - FUNCTIONAL ASSESSMENT: PAIN_FUNCTIONAL_ASSESSMENT: 0-10

## 2025-06-30 NOTE — TELEPHONE ENCOUNTER
Patient has not been seen in over 1 year; needs appointment for further refills.  He speaks mandarin; requires .  Son is english speaking and is primary contact.  I would prefer to get labs prior to his appointment to make his visit more efficient; let me know once scheduled and will place orders.

## 2025-07-01 NOTE — ED PROVIDER NOTES
[facility] Emergency Department  [address]  [facility] [place] [address]  Phone: [phone]  Fax: [phone]      Patient Name:  Jeffery Quan  Medical Record Number:  [id number]  YOB: 1960  Date of Service:  6/30/2025  Primary Care Physician:  Rosendo Bobby DO      CHIEF COMPLAINT:       Chief Complaint   Patient presents with    Laceration     Pt arrives with co right hand thumb lac and which occurred when pt cut it on a knife .       HISTORY OF PRESENT ILLNESS:    Jeffery Quan is a 64 y.o. male who presents with the complaint of laceration to the right thumb sustained while utilizing a kitchen knife this evening.  Patient reports a difficult time getting the bleeding to stop and was concern for possible underlying tendon injury considering the depth of the laceration.    Patient does not know the status of his last tetanus shot but believes that it has been over 10 years.    CURRENT MEDICATIONS:      Discharge Medication List as of 6/30/2025 10:09 PM        CONTINUE these medications which have NOT CHANGED    Details   atorvastatin (LIPITOR) 20 MG tablet Take 1 tablet by mouth daily, Disp-30 tablet, R-0Normal      metFORMIN (GLUCOPHAGE-XR) 500 MG extended release tablet Take 1 tablet by mouth daily (with breakfast), Disp-90 tablet, R-1Normal      omeprazole (PRILOSEC) 20 MG delayed release capsule Take 1 capsule by mouth every morning (before breakfast), Disp-90 capsule, R-1Normal      blood glucose monitor strips Test once a day & as needed for symptoms of irregular blood glucose. Dispense sufficient amount for indicated testing frequency plus additional to accommodate PRN testing needs.Test once a day & as needed for symptoms of irregular blood glucose. Dispense  sufficient amount for indicated testing frequency plus additional to accommodate PRN testing needs., Disp-100 strip, R-3, Normal      Blood Glucose Monitoring Suppl (ACCU-CHEK GUIDE ME) w/Device KIT Test once a day & as

## 2025-07-01 NOTE — DISCHARGE INSTRUCTIONS
Go to your primary care physician or return to the emergency department in 7-10 days to have your sutures removed.    Wash 3 times a day with soap and water  Pat the area dry  Apply bacitracin or aquaphor  Place a bandage  Place the splint over the thumb.    For pain use acetaminophen (Tylenol) or ibuprofen (Motrin / Advil), unless prescribed medications that have acetaminophen or ibuprofen (or similar medications) in it.  You can take over the counter acetaminophen tablets (1 - 2 tablets of the 500-mg strength every 6 hours) or ibuprofen tablets (2 tablets every 4 hours).    You can shower with the laceration, would avoid baths or swimming in lakes / rivers.  Apply bacitracin / triple antibiotic ointment / Neosporin / Vaseline to the wound twice a day.    When you go outside, place sunscreen on the healing wound after the sutures have been removed for the next year to help with scarring.    PLEASE RETURN TO THE EMERGENCY DEPARTMENT IMMEDIATELY for worsening symptoms, redness around the wound or redness streaking up the body part, white drainage from the wound, or if you develop any concerning symptoms such as: high fever not relieved by acetaminophen (Tylenol) and/or ibuprofen (Motrin / Advil), chills, shortness of breath, chest pain, feeling of your heart fluttering or racing, persistent nausea and/or vomiting, vomiting up blood, blood in your stool, numbness, loss of consciousness, weakness or tingling in the arms or legs or change in color of the extremities, changes in mental status, persistent headache, blurry vision, loss of bladder / bowel control, unable to follow up with your physician, or other any other care or concern.

## 2025-07-01 NOTE — ED PROVIDER NOTES
[facility] Emergency Department  [address]  [place] [address]  Phone: [phone]  Fax: [phone]      Attending Physician Attestation    Based on the medical record, the care appears appropriate. I was personally available for consultation in the Emergency Department. I did have a discussion with our midlevel provider regarding the care of this patient.  I reviewed the mid level provider's note and agree with the documented findings and plan of care.   I have reviewed the emergency nurses triage note. I agree with the chief complaint, past medical history, past surgical history, allergies, medications, social and family history as documented unless otherwise noted below.       CHIEF COMPLAINT       Chief Complaint   Patient presents with    Laceration     Pt arrives with co right hand thumb lac and which occurred when pt cut it on a knife .         PAST MEDICAL HISTORY    has a past medical history of Carotid atherosclerosis, Cataracts, bilateral, Hydronephrosis, Kidney stone, Nonalcoholic fatty liver, Renal cyst, Sleep apnea, and Thyroid nodule.    SURGICAL HISTORY      has a past surgical history that includes Cholecystectomy.    CURRENT MEDICATIONS       Discharge Medication List as of 6/30/2025 10:09 PM        CONTINUE these medications which have NOT CHANGED    Details   atorvastatin (LIPITOR) 20 MG tablet Take 1 tablet by mouth daily, Disp-30 tablet, R-0Normal      metFORMIN (GLUCOPHAGE-XR) 500 MG extended release tablet Take 1 tablet by mouth daily (with breakfast), Disp-90 tablet, R-1Normal      omeprazole (PRILOSEC) 20 MG delayed release capsule Take 1 capsule by mouth every morning (before breakfast), Disp-90 capsule, R-1Normal      blood glucose monitor strips Test once a day & as needed for symptoms of irregular blood glucose. Dispense sufficient amount for indicated testing frequency plus additional to accommodate PRN testing needs.Test once a day & as needed for symptoms of

## 2025-07-08 ENCOUNTER — OFFICE VISIT (OUTPATIENT)
Dept: PRIMARY CARE CLINIC | Age: 65
End: 2025-07-08
Payer: COMMERCIAL

## 2025-07-08 VITALS
HEART RATE: 81 BPM | DIASTOLIC BLOOD PRESSURE: 64 MMHG | WEIGHT: 143 LBS | OXYGEN SATURATION: 96 % | HEIGHT: 63 IN | SYSTOLIC BLOOD PRESSURE: 110 MMHG | BODY MASS INDEX: 25.34 KG/M2

## 2025-07-08 DIAGNOSIS — S61.011A LACERATION OF RIGHT THUMB WITHOUT FOREIGN BODY WITHOUT DAMAGE TO NAIL, INITIAL ENCOUNTER: ICD-10-CM

## 2025-07-08 DIAGNOSIS — R73.03 PREDIABETES: Primary | ICD-10-CM

## 2025-07-08 DIAGNOSIS — E78.00 PURE HYPERCHOLESTEROLEMIA: ICD-10-CM

## 2025-07-08 LAB — HBA1C MFR BLD: 6.1 %

## 2025-07-08 PROCEDURE — 83036 HEMOGLOBIN GLYCOSYLATED A1C: CPT | Performed by: STUDENT IN AN ORGANIZED HEALTH CARE EDUCATION/TRAINING PROGRAM

## 2025-07-08 PROCEDURE — 99214 OFFICE O/P EST MOD 30 MIN: CPT | Performed by: STUDENT IN AN ORGANIZED HEALTH CARE EDUCATION/TRAINING PROGRAM

## 2025-07-08 SDOH — ECONOMIC STABILITY: FOOD INSECURITY: WITHIN THE PAST 12 MONTHS, THE FOOD YOU BOUGHT JUST DIDN'T LAST AND YOU DIDN'T HAVE MONEY TO GET MORE.: NEVER TRUE

## 2025-07-08 SDOH — ECONOMIC STABILITY: FOOD INSECURITY: WITHIN THE PAST 12 MONTHS, YOU WORRIED THAT YOUR FOOD WOULD RUN OUT BEFORE YOU GOT MONEY TO BUY MORE.: NEVER TRUE

## 2025-07-08 ASSESSMENT — PATIENT HEALTH QUESTIONNAIRE - PHQ9
SUM OF ALL RESPONSES TO PHQ QUESTIONS 1-9: 0
SUM OF ALL RESPONSES TO PHQ QUESTIONS 1-9: 0
2. FEELING DOWN, DEPRESSED OR HOPELESS: NOT AT ALL
SUM OF ALL RESPONSES TO PHQ QUESTIONS 1-9: 0
1. LITTLE INTEREST OR PLEASURE IN DOING THINGS: NOT AT ALL
SUM OF ALL RESPONSES TO PHQ QUESTIONS 1-9: 0

## 2025-07-08 NOTE — PROGRESS NOTES
[facility]  [facility] PRIMARY [facility]ARE  [address] [place] [id number]  Dept: [phone]       Date of Visit:  2025  Patient Name: Jeffery Quan   Patient :  1960     CHIEF COMPLAINT:     Jeffery Quan is a 64 y.o. male who presents today to be evaluated for the following condition(s):  Chief Complaint   Patient presents with    ED Follow-up     ED on 25 for thumb laceration, 3 stitches. Pt would like the stitches removed today.        HISTORY OF PRESENT ILLNESS:      History of Present Illness  Patient is presenting today for ED follow up.     used during today's office visit as patient's native language is Mandarin.    He had his thumb sutured in the ER after cutting it on a rabbit leg. He is not experiencing any pain, bleeding, or drainage from the thumb. He works as a  and is concerned about the impact of the stitches on his work.    Overall, patient reports that he is feeling well.  He was previously prescribed metformin but only took this for approximately 1 month before discontinuing; he tolerated the medication well but as he had made significant dietary changes in reducing his carbohydrate intake, his fasting BG was consistently normal and he no longer required it.  He tolerates his atorvastatin well without adverse effects.      REVIEW OF SYSTEMS:     Review of Systems   As per HPI; otherwise reviewed and negative.      REVIEWED INFORMATION:      Current Outpatient Medications on File Prior to Visit   Medication Sig Dispense Refill    metFORMIN (GLUCOPHAGE-XR) 500 MG extended release tablet Take 1 tablet by mouth daily (with breakfast) 90 tablet 1    omeprazole (PRILOSEC) 20 MG delayed release capsule Take 1 capsule by mouth every morning (before breakfast) 90 capsule 1    blood glucose monitor strips Test once a day & as needed for symptoms of irregular blood glucose. Dispense sufficient amount for indicated testing frequency plus additional to

## 2025-07-15 NOTE — ASSESSMENT & PLAN NOTE
Chronic, at goal (stable), continue current treatment plan  - HbgA1c improved from 7.0 previously to 6.1 today without having to take metformin.  - Patient's HbgA1c was in the diabetic range on a single occasion with a fasting BG in the prediabetic range at that time; does not technically meet diagnostic criteria for T2DM.  - Patient doing very well with lifestyle modification which he will continue.    Orders:    Comprehensive Metabolic Panel; Future    Hemoglobin A1C; Future    POCT glycosylated hemoglobin (Hb A1C)

## 2025-07-15 NOTE — ASSESSMENT & PLAN NOTE
Chronic, at goal (stable), continue current treatment plan  - Last LDL was 65 in 12/2024; down from 133 at diagnosis.  - Continue atorvastatin as prescribed.    Orders:    Comprehensive Metabolic Panel; Future    Lipid Panel; Future

## 2025-07-24 ENCOUNTER — COMMUNITY OUTREACH (OUTPATIENT)
Dept: PRIMARY CARE CLINIC | Age: 65
End: 2025-07-24